# Patient Record
Sex: MALE
[De-identification: names, ages, dates, MRNs, and addresses within clinical notes are randomized per-mention and may not be internally consistent; named-entity substitution may affect disease eponyms.]

---

## 2019-01-08 ENCOUNTER — FORM ENCOUNTER (OUTPATIENT)
Age: 39
End: 2019-01-08

## 2019-01-09 ENCOUNTER — APPOINTMENT (OUTPATIENT)
Dept: ORTHOPEDIC SURGERY | Facility: CLINIC | Age: 39
End: 2019-01-09
Payer: COMMERCIAL

## 2019-01-09 ENCOUNTER — APPOINTMENT (OUTPATIENT)
Dept: RADIOLOGY | Facility: CLINIC | Age: 39
End: 2019-01-09

## 2019-01-09 ENCOUNTER — OUTPATIENT (OUTPATIENT)
Dept: OUTPATIENT SERVICES | Facility: HOSPITAL | Age: 39
LOS: 1 days | End: 2019-01-09
Payer: COMMERCIAL

## 2019-01-09 VITALS — WEIGHT: 165 LBS | HEIGHT: 68 IN | BODY MASS INDEX: 25.01 KG/M2 | RESPIRATION RATE: 16 BRPM

## 2019-01-09 DIAGNOSIS — Z86.79 PERSONAL HISTORY OF OTHER DISEASES OF THE CIRCULATORY SYSTEM: ICD-10-CM

## 2019-01-09 DIAGNOSIS — M25.311 OTHER INSTABILITY, RIGHT SHOULDER: ICD-10-CM

## 2019-01-09 DIAGNOSIS — M25.561 PAIN IN RIGHT KNEE: ICD-10-CM

## 2019-01-09 DIAGNOSIS — Z78.9 OTHER SPECIFIED HEALTH STATUS: ICD-10-CM

## 2019-01-09 PROBLEM — Z00.00 ENCOUNTER FOR PREVENTIVE HEALTH EXAMINATION: Status: ACTIVE | Noted: 2019-01-09

## 2019-01-09 PROCEDURE — 73030 X-RAY EXAM OF SHOULDER: CPT | Mod: 26,RT

## 2019-01-09 PROCEDURE — 76882 US LMTD JT/FCL EVL NVASC XTR: CPT | Mod: RT

## 2019-01-09 PROCEDURE — 73030 X-RAY EXAM OF SHOULDER: CPT

## 2019-01-09 PROCEDURE — 73564 X-RAY EXAM KNEE 4 OR MORE: CPT | Mod: 26,RT

## 2019-01-09 PROCEDURE — 99204 OFFICE O/P NEW MOD 45 MIN: CPT

## 2019-01-09 PROCEDURE — 73564 X-RAY EXAM KNEE 4 OR MORE: CPT

## 2019-01-09 RX ORDER — WARFARIN 5 MG/1
5 TABLET ORAL
Refills: 0 | Status: ACTIVE | COMMUNITY

## 2019-01-09 RX ORDER — CITALOPRAM 20 MG/1
20 TABLET, FILM COATED ORAL
Refills: 0 | Status: ACTIVE | COMMUNITY

## 2019-01-09 RX ORDER — BISOPROLOL FUMARATE AND HYDROCHLOROTHIAZIDE 2.5; 6.25 MG/1; MG/1
2.5-6.25 TABLET, FILM COATED ORAL
Refills: 0 | Status: ACTIVE | COMMUNITY

## 2019-01-09 NOTE — HISTORY OF PRESENT ILLNESS
[All Other ROS Normal] : All other review of systems are negative except as noted [Joint Pain] : joint pain [Joint Stiffness] : joint stiffness [Past Hx] : past medical [All] : medication and allergy [FreeTextEntry1] : right shoulder and right knee pain [FreeTextEntry2] : DOI: 8 weeks ago\par \par Chief Complaint: right shoulder and right knee pain\par Date of Injury/onset: 8 weeks ago\par Summary of symptoms: right shoulder and right knee pain\par Severity of Pain: 5/10\par Character of Pain: sharp in shoulder, achy in knee\par What and when makes pain worse: shoulder: reaching, overhead activities. knee: twisting, prolonged walking\par Associated symptoms: none\par Alleviating factors: rest and NSAIDs\par \par Mr. DIANNE ANTUNEZ is a 38 year male who presents today for an evaluation of right shoulder and right knee pain that have been ongoing for 8 weeks due to an injury. The patient fell and landed on his right outstretched arm and his right knee and had pain in both afterwards. Right shoulder pain occurs with reaching and overhead activities. Right knee pain occurs with twisting activities, prolonged walking. Patient states his right shoulder pain is worse than his right knee pain.\par \par Patient cannot get MRI due to presence of a non-MRI compatible pacemaker.

## 2019-01-09 NOTE — PHYSICAL EXAM
[FreeTextEntry2] : General: Patient is awake and alert, demonstrates appropriate mood and affect, exhibits normal breathing and is in no acute distress.\par Psych: The patient is appropriately dressed and groomed, maintains good eye contact. Alert and oriented x 3. Normal attention/concentration, fund of knowledge and recall. Normal speech rate and rhythm. No hallucinations, suicidal or homicidal ideations. Demonstrates expected level of insight and judgment regarding health.\par Skin: The patient has no chronic skin lesions, rashes, or ulcers. There is no induration or erythema of uninvolved extremities. For skin exam of involved extremity refer to detailed musculoskeletal/extremity exam. \par Lymph: No cervical, axillary, or popliteal lymphadenopathy. There is no swelling or lymphedema in uninvolved extremities, refer to detailed exam for involved limbs.\par Cardiovascular: No visible jugular venous distention. Normal point of maximal impulse without thrill. There is brisk capillary refill in the digits of the affected extremity. They are symmetric pulses in the bilateral upper and lower extremities. \par Respiratory: The patient is in no apparent respiratory distress. They're taking full deep breaths wirh normal excursion, without use of accessory muscles or evidence of audible wheezes or stridor without the use of a stethoscope. \par Neurological: 5/5 motor strength and sensation intact throughout uninvolved upper and loweer extremities, refer to detailed musculoskeletal exam regarding involved extremity.\par Neck: Full range of motion with flexon, extention, rotation, and side bending; no palpable crepitus, normal alignment and lordosis, symmetric appearance, midline trachea, no thyroid hypertrophy or nodules\par Musculoskeletal: [normal gait]. good posture. normal clinical alignment of the spine. full range of motion in [bilateral] upper and [bilateral] lower extremities except noted below.\par \par Right Shoulder\par The following exams were performed on the involved shoulder.  ROM, stability and strength were compared with contralateral shoulder for control:\par Range of Motion:  Active and Passive in FE, Abd, ER, IR, AbdER, AbdIR\par Tenderness Evaluation: Acromioclavicular joint, bicipital groove, rotator cuff insertion, joint line \par Strength: FE, Abd, ER, IR, AbdER, AbdIR\par Impingement:  Rai, Neer, Crossbody\par Cuff Tests: Empty Can, Bear Hug, Belly Press, Liftoff, Hornblower\par Stability:  Apprehension/Relocation, A/P Load-shift (grade 1-4) v Contralateral, Sulcus, Jerk Test\par Biceps/SLAP: OBriens, Speeds, Yergasons, Yancy, SLAP test\par \par All findings were within normal limits except for the following:\par 2-3+ posterior instability on the right shoulder, 2+ posterior instability on contralateral side. Positive Empty Can, Positive Speed's. Pain with crossbody reach. Painful Bear Hug, painful Belly Press but normal strength, Positive Liftoff. +Jerk and +Yancy test.\par \par Right Knee:\par \par Examination of the involved knee was performed inclusive of the following tests:\par \par Inspection:  effusion,quadriceps atrophy, skin\par \par Gait: stride length, jeanie, heel strike\par \par Range of Motion: active and passive with comparison to contralateral knee\par \par Strength Testing: flexion and extention\par \par Tenderness Evaluation: medial and lateral joint line, medial and lateral patellar facet, quadriceps and patellar tendon, hamstring, pes anserinus\par \par Stability: varus and valgus at 0 and 30 degrees (1-3+), Lachman (1A unless noted),  anterior drawer (1-3+), posterior drawer (1-3+), pivot-shift (normal, glide, shift)\par \par Meniscus: Chepe, Cris\par \par Patellofemoral: patellar grind, patellar compression, tracking, J-sign, tilt, test, apprehension, medial and lateral translation (2 quadrants unless otherwise noted)\par \par Abnormal findings were as follows:\par Patient states pain is at posterolateral joint line although he does not have joint line tenderness.\par Retropatellar crepitus with pain, + patellar compression. Ligamentously stable. Negative Chepe's.\par  [de-identified] : Xrays of the right shoulder done today were reviewed with the patient in clinic. They demonstrate no notable lesions and no evidence of fracture\par \par Xrays of the right knee done today demonstrate well maintained joint spaces and mild lateral patellar tilt seen on [place].\par \par Bedside ultrasound performed in clinic today demonstrates biceps tendinosis, subscapularis intact. Interstitial tear in supraspinatus but footprint is intact. No Hill Sachs lesion noted.

## 2019-01-09 NOTE — DISCUSSION/SUMMARY
[de-identified] : Based on today's visit the patient has been diagnosed with right shoulder posterior instability status post subluxation vs dislocation event and right knee pain. I suspect he has a small reverse Hill Sachs lesion although it was not apparent on Xray and bedside ultrasound. We discussed the option of obtaining a CT scan to further evaluate it however the patient declined as it does not change our initial plan to send him to PT regardless. I also recommend the patient avoid boxing for 1-2 months. \par \par The patient's history, physical exam and any relevant studies were reviewed with the patient at length. We reviewed relevant additional diagnostic studies and treatment options including no intervention, activity modification techniques, available pharmaceuticals, therapy, durable medical equipment/bracing, and surgical interventions if applicable. The risks and benefits of all treatments were reviewed, including the potential morbidity of untreated pathology.\par \par Following discussion and shared decision-making, the patient has elected to proceed with conservative management.\par \par Planned Interventions: activity modification, NSAIDs\par \par Referrals: Physical Therapy\par \par Additional Studies: [None]\par \par Followup: 6 weeks as needed for reevaluation, or earlier if symptoms worsen or fail to improve\par \par X-rays at followup: [None]